# Patient Record
Sex: MALE | Race: BLACK OR AFRICAN AMERICAN | NOT HISPANIC OR LATINO | Employment: UNEMPLOYED | URBAN - METROPOLITAN AREA
[De-identification: names, ages, dates, MRNs, and addresses within clinical notes are randomized per-mention and may not be internally consistent; named-entity substitution may affect disease eponyms.]

---

## 2018-11-10 ENCOUNTER — HOSPITAL ENCOUNTER (INPATIENT)
Facility: HOSPITAL | Age: 23
LOS: 1 days | Discharge: HOME | DRG: 639 | End: 2018-11-11
Admitting: HOSPITALIST
Payer: MEDICARE

## 2018-11-10 DIAGNOSIS — R73.9 HYPERGLYCEMIA: ICD-10-CM

## 2018-11-10 DIAGNOSIS — E10.9 TYPE 1 DIABETES MELLITUS WITHOUT COMPLICATION: ICD-10-CM

## 2018-11-10 DIAGNOSIS — R52 PAIN: ICD-10-CM

## 2018-11-10 DIAGNOSIS — E10.10 DIABETIC KETOACIDOSIS WITHOUT COMA ASSOCIATED WITH TYPE 1 DIABETES MELLITUS: Primary | ICD-10-CM

## 2018-11-10 PROBLEM — E11.9 DIABETES: Chronic | Status: ACTIVE | Noted: 2018-11-10

## 2018-11-10 LAB
BACTERIA #/AREA URNS HPF: NORMAL /HPF
BILIRUB UR QL STRIP: NEGATIVE
CLARITY UR: CLEAR
COLOR UR: YELLOW
GLUCOSE UR QL STRIP: ABNORMAL
HGB UR QL STRIP: NEGATIVE
KETONES UR QL STRIP: ABNORMAL
LEUKOCYTE ESTERASE UR QL STRIP: NEGATIVE
MICROSCOPIC COMMENT: NORMAL
NITRITE UR QL STRIP: NEGATIVE
PH UR STRIP: 6 [PH] (ref 5–8)
PROT UR QL STRIP: NEGATIVE
RBC #/AREA URNS HPF: 2 /HPF (ref 0–4)
SP GR UR STRIP: <=1.005 (ref 1–1.03)
URN SPEC COLLECT METH UR: ABNORMAL
UROBILINOGEN UR STRIP-ACNC: NEGATIVE EU/DL
YEAST URNS QL MICRO: NORMAL

## 2018-11-10 PROCEDURE — 82962 GLUCOSE BLOOD TEST: CPT

## 2018-11-10 PROCEDURE — 25000003 PHARM REV CODE 250

## 2018-11-10 PROCEDURE — 87491 CHLMYD TRACH DNA AMP PROBE: CPT

## 2018-11-10 PROCEDURE — 99291 CRITICAL CARE FIRST HOUR: CPT | Mod: 25

## 2018-11-10 PROCEDURE — 81000 URINALYSIS NONAUTO W/SCOPE: CPT

## 2018-11-10 PROCEDURE — 87086 URINE CULTURE/COLONY COUNT: CPT

## 2018-11-10 PROCEDURE — 12000002 HC ACUTE/MED SURGE SEMI-PRIVATE ROOM

## 2018-11-10 RX ORDER — SODIUM CHLORIDE 9 MG/ML
1000 INJECTION, SOLUTION INTRAVENOUS
Status: COMPLETED | OUTPATIENT
Start: 2018-11-10 | End: 2018-11-11

## 2018-11-10 RX ORDER — IBUPROFEN 600 MG/1
600 TABLET ORAL
Status: COMPLETED | OUTPATIENT
Start: 2018-11-10 | End: 2018-11-10

## 2018-11-10 RX ORDER — INSULIN LISPRO 100 [IU]/ML
INJECTION, SOLUTION INTRAVENOUS; SUBCUTANEOUS
Status: ON HOLD | COMMUNITY
End: 2018-11-11 | Stop reason: CLARIF

## 2018-11-10 RX ADMIN — IBUPROFEN 600 MG: 600 TABLET, FILM COATED ORAL at 11:11

## 2018-11-11 VITALS
RESPIRATION RATE: 63 BRPM | BODY MASS INDEX: 18.4 KG/M2 | WEIGHT: 107.81 LBS | TEMPERATURE: 98 F | HEIGHT: 64 IN | SYSTOLIC BLOOD PRESSURE: 141 MMHG | HEART RATE: 106 BPM | OXYGEN SATURATION: 100 % | DIASTOLIC BLOOD PRESSURE: 82 MMHG

## 2018-11-11 PROBLEM — E10.9 TYPE 1 DIABETES MELLITUS WITHOUT COMPLICATION: Status: ACTIVE | Noted: 2018-11-11

## 2018-11-11 PROBLEM — R73.9 HYPERGLYCEMIA: Status: ACTIVE | Noted: 2018-11-11

## 2018-11-11 PROBLEM — E10.10 DIABETIC KETOACIDOSIS WITHOUT COMA ASSOCIATED WITH TYPE 1 DIABETES MELLITUS: Status: RESOLVED | Noted: 2018-11-11 | Resolved: 2018-11-11

## 2018-11-11 PROBLEM — E10.10 DIABETIC KETOACIDOSIS WITHOUT COMA ASSOCIATED WITH TYPE 1 DIABETES MELLITUS: Status: ACTIVE | Noted: 2018-11-11

## 2018-11-11 LAB
ALBUMIN SERPL BCP-MCNC: 3.4 G/DL
ALBUMIN SERPL BCP-MCNC: 4.1 G/DL
ALLENS TEST: ABNORMAL
ALP SERPL-CCNC: 142 U/L
ALP SERPL-CCNC: 174 U/L
ALT SERPL W/O P-5'-P-CCNC: 30 U/L
ALT SERPL W/O P-5'-P-CCNC: 39 U/L
ANION GAP SERPL CALC-SCNC: 13 MMOL/L
ANION GAP SERPL CALC-SCNC: 18 MMOL/L
ANION GAP SERPL CALC-SCNC: 9 MMOL/L
AST SERPL-CCNC: 25 U/L
AST SERPL-CCNC: 29 U/L
B-OH-BUTYR BLD STRIP-SCNC: 3.4 MMOL/L
BASOPHILS # BLD AUTO: 0.03 K/UL
BASOPHILS # BLD AUTO: ABNORMAL K/UL
BASOPHILS NFR BLD: 0 %
BASOPHILS NFR BLD: 0.2 %
BILIRUB SERPL-MCNC: 1 MG/DL
BILIRUB SERPL-MCNC: 1.3 MG/DL
BUN SERPL-MCNC: 13 MG/DL
BUN SERPL-MCNC: 17 MG/DL
BUN SERPL-MCNC: 22 MG/DL
CALCIUM SERPL-MCNC: 11 MG/DL
CALCIUM SERPL-MCNC: 9.1 MG/DL
CALCIUM SERPL-MCNC: 9.2 MG/DL
CHLORIDE SERPL-SCNC: 102 MMOL/L
CHLORIDE SERPL-SCNC: 103 MMOL/L
CHLORIDE SERPL-SCNC: 87 MMOL/L
CO2 SERPL-SCNC: 18 MMOL/L
CO2 SERPL-SCNC: 21 MMOL/L
CO2 SERPL-SCNC: 24 MMOL/L
CREAT SERPL-MCNC: 0.7 MG/DL
CREAT SERPL-MCNC: 0.7 MG/DL
CREAT SERPL-MCNC: 1.5 MG/DL
DIFFERENTIAL METHOD: ABNORMAL
DIFFERENTIAL METHOD: ABNORMAL
EOSINOPHIL # BLD AUTO: 0 K/UL
EOSINOPHIL # BLD AUTO: ABNORMAL K/UL
EOSINOPHIL NFR BLD: 0.2 %
EOSINOPHIL NFR BLD: 1 %
ERYTHROCYTE [DISTWIDTH] IN BLOOD BY AUTOMATED COUNT: 12.1 %
ERYTHROCYTE [DISTWIDTH] IN BLOOD BY AUTOMATED COUNT: 12.1 %
EST. GFR  (AFRICAN AMERICAN): >60 ML/MIN/1.73 M^2
EST. GFR  (NON AFRICAN AMERICAN): >60 ML/MIN/1.73 M^2
ESTIMATED AVG GLUCOSE: 344 MG/DL
GLUCOSE SERPL-MCNC: 185 MG/DL
GLUCOSE SERPL-MCNC: 237 MG/DL
GLUCOSE SERPL-MCNC: 790 MG/DL
HBA1C MFR BLD HPLC: 13.6 %
HCO3 UR-SCNC: 31.6 MMOL/L (ref 24–28)
HCT VFR BLD AUTO: 43.8 %
HCT VFR BLD AUTO: 52.2 %
HGB BLD-MCNC: 14.4 G/DL
HGB BLD-MCNC: 16.8 G/DL
LYMPHOCYTES # BLD AUTO: 2.8 K/UL
LYMPHOCYTES # BLD AUTO: ABNORMAL K/UL
LYMPHOCYTES NFR BLD: 20.8 %
LYMPHOCYTES NFR BLD: 47 %
MAGNESIUM SERPL-MCNC: 2.1 MG/DL
MCH RBC QN AUTO: 32.4 PG
MCH RBC QN AUTO: 32.9 PG
MCHC RBC AUTO-ENTMCNC: 32.2 G/DL
MCHC RBC AUTO-ENTMCNC: 32.9 G/DL
MCV RBC AUTO: 102 FL
MCV RBC AUTO: 99 FL
MONOCYTES # BLD AUTO: 1 K/UL
MONOCYTES # BLD AUTO: ABNORMAL K/UL
MONOCYTES NFR BLD: 6 %
MONOCYTES NFR BLD: 7.3 %
NEUTROPHILS # BLD AUTO: 9.6 K/UL
NEUTROPHILS NFR BLD: 46 %
NEUTROPHILS NFR BLD: 71.2 %
PCO2 BLDA: 60.7 MMHG (ref 35–45)
PH SMN: 7.33 [PH] (ref 7.35–7.45)
PHOSPHATE SERPL-MCNC: 4.1 MG/DL
PLATELET # BLD AUTO: 281 K/UL
PLATELET # BLD AUTO: 378 K/UL
PMV BLD AUTO: 8.8 FL
PMV BLD AUTO: 9.4 FL
PO2 BLDA: 22 MMHG (ref 40–60)
POC BE: 6 MMOL/L
POC SATURATED O2: 32 % (ref 95–100)
POC TCO2: 33 MMOL/L (ref 24–29)
POCT GLUCOSE: 158 MG/DL (ref 70–110)
POCT GLUCOSE: 171 MG/DL (ref 70–110)
POCT GLUCOSE: 177 MG/DL (ref 70–110)
POCT GLUCOSE: 237 MG/DL (ref 70–110)
POCT GLUCOSE: 247 MG/DL (ref 70–110)
POCT GLUCOSE: 363 MG/DL (ref 70–110)
POCT GLUCOSE: 374 MG/DL (ref 70–110)
POCT GLUCOSE: 407 MG/DL (ref 70–110)
POTASSIUM SERPL-SCNC: 4.2 MMOL/L
POTASSIUM SERPL-SCNC: 4.8 MMOL/L
POTASSIUM SERPL-SCNC: 6 MMOL/L
PROT SERPL-MCNC: 6.7 G/DL
PROT SERPL-MCNC: 7.9 G/DL
RBC # BLD AUTO: 4.44 M/UL
RBC # BLD AUTO: 5.11 M/UL
SAMPLE: ABNORMAL
SITE: ABNORMAL
SODIUM SERPL-SCNC: 129 MMOL/L
SODIUM SERPL-SCNC: 132 MMOL/L
SODIUM SERPL-SCNC: 134 MMOL/L
WBC # BLD AUTO: 11.9 K/UL
WBC # BLD AUTO: 13.48 K/UL

## 2018-11-11 PROCEDURE — S5571 INSULIN DISPOS PEN 3 ML: HCPCS | Performed by: FAMILY MEDICINE

## 2018-11-11 PROCEDURE — 80048 BASIC METABOLIC PNL TOTAL CA: CPT

## 2018-11-11 PROCEDURE — 80053 COMPREHEN METABOLIC PANEL: CPT | Mod: 91

## 2018-11-11 PROCEDURE — 36415 COLL VENOUS BLD VENIPUNCTURE: CPT

## 2018-11-11 PROCEDURE — 99223 1ST HOSP IP/OBS HIGH 75: CPT | Mod: AI,,, | Performed by: FAMILY MEDICINE

## 2018-11-11 PROCEDURE — 85007 BL SMEAR W/DIFF WBC COUNT: CPT

## 2018-11-11 PROCEDURE — 25000003 PHARM REV CODE 250

## 2018-11-11 PROCEDURE — 82010 KETONE BODYS QUAN: CPT

## 2018-11-11 PROCEDURE — 63600175 PHARM REV CODE 636 W HCPCS: Performed by: HOSPITALIST

## 2018-11-11 PROCEDURE — 83735 ASSAY OF MAGNESIUM: CPT

## 2018-11-11 PROCEDURE — 83036 HEMOGLOBIN GLYCOSYLATED A1C: CPT

## 2018-11-11 PROCEDURE — 96376 TX/PRO/DX INJ SAME DRUG ADON: CPT

## 2018-11-11 PROCEDURE — 20000000 HC ICU ROOM

## 2018-11-11 PROCEDURE — 84100 ASSAY OF PHOSPHORUS: CPT

## 2018-11-11 PROCEDURE — S5010 5% DEXTROSE AND 0.45% SALINE: HCPCS | Performed by: FAMILY MEDICINE

## 2018-11-11 PROCEDURE — 80053 COMPREHEN METABOLIC PANEL: CPT

## 2018-11-11 PROCEDURE — 63600175 PHARM REV CODE 636 W HCPCS: Performed by: FAMILY MEDICINE

## 2018-11-11 PROCEDURE — 94761 N-INVAS EAR/PLS OXIMETRY MLT: CPT

## 2018-11-11 PROCEDURE — 96361 HYDRATE IV INFUSION ADD-ON: CPT

## 2018-11-11 PROCEDURE — 82800 BLOOD PH: CPT

## 2018-11-11 PROCEDURE — 85027 COMPLETE CBC AUTOMATED: CPT

## 2018-11-11 PROCEDURE — 63600175 PHARM REV CODE 636 W HCPCS

## 2018-11-11 PROCEDURE — 85025 COMPLETE CBC W/AUTO DIFF WBC: CPT

## 2018-11-11 PROCEDURE — 96374 THER/PROPH/DIAG INJ IV PUSH: CPT

## 2018-11-11 PROCEDURE — 82803 BLOOD GASES ANY COMBINATION: CPT

## 2018-11-11 PROCEDURE — 25000003 PHARM REV CODE 250: Performed by: FAMILY MEDICINE

## 2018-11-11 RX ORDER — GLUCAGON 1 MG
1 KIT INJECTION
Status: DISCONTINUED | OUTPATIENT
Start: 2018-11-11 | End: 2018-11-11 | Stop reason: HOSPADM

## 2018-11-11 RX ORDER — IBUPROFEN 600 MG/1
600 TABLET ORAL
Status: DISCONTINUED | OUTPATIENT
Start: 2018-11-11 | End: 2018-11-11

## 2018-11-11 RX ORDER — AMOXICILLIN 500 MG/1
500 CAPSULE ORAL EVERY 12 HOURS
COMMUNITY
Start: 2018-11-05 | End: 2018-11-12

## 2018-11-11 RX ORDER — INSULIN ASPART 100 [IU]/ML
1-10 INJECTION, SOLUTION INTRAVENOUS; SUBCUTANEOUS
Status: DISCONTINUED | OUTPATIENT
Start: 2018-11-11 | End: 2018-11-11 | Stop reason: HOSPADM

## 2018-11-11 RX ORDER — DEXTROSE MONOHYDRATE 100 MG/ML
1000 INJECTION, SOLUTION INTRAVENOUS
Status: DISCONTINUED | OUTPATIENT
Start: 2018-11-11 | End: 2018-11-11

## 2018-11-11 RX ORDER — INSULIN ASPART 100 [IU]/ML
30 INJECTION, SUSPENSION SUBCUTANEOUS 2 TIMES DAILY
COMMUNITY

## 2018-11-11 RX ORDER — ENOXAPARIN SODIUM 100 MG/ML
40 INJECTION SUBCUTANEOUS EVERY 24 HOURS
Status: DISCONTINUED | OUTPATIENT
Start: 2018-11-11 | End: 2018-11-11 | Stop reason: HOSPADM

## 2018-11-11 RX ORDER — INSULIN ASPART 100 [IU]/ML
5 INJECTION, SOLUTION INTRAVENOUS; SUBCUTANEOUS
Status: DISCONTINUED | OUTPATIENT
Start: 2018-11-11 | End: 2018-11-11 | Stop reason: HOSPADM

## 2018-11-11 RX ORDER — IBUPROFEN 200 MG
16 TABLET ORAL
Status: DISCONTINUED | OUTPATIENT
Start: 2018-11-11 | End: 2018-11-11 | Stop reason: HOSPADM

## 2018-11-11 RX ORDER — SODIUM CHLORIDE AND POTASSIUM CHLORIDE 150; 450 MG/100ML; MG/100ML
INJECTION, SOLUTION INTRAVENOUS CONTINUOUS
Status: DISCONTINUED | OUTPATIENT
Start: 2018-11-11 | End: 2018-11-11 | Stop reason: HOSPADM

## 2018-11-11 RX ORDER — DEXTROSE MONOHYDRATE AND SODIUM CHLORIDE 5; .45 G/100ML; G/100ML
INJECTION, SOLUTION INTRAVENOUS CONTINUOUS
Status: DISCONTINUED | OUTPATIENT
Start: 2018-11-11 | End: 2018-11-11

## 2018-11-11 RX ORDER — IBUPROFEN 200 MG
24 TABLET ORAL
Status: DISCONTINUED | OUTPATIENT
Start: 2018-11-11 | End: 2018-11-11 | Stop reason: HOSPADM

## 2018-11-11 RX ORDER — IBUPROFEN 200 MG
1 TABLET ORAL DAILY
Status: DISCONTINUED | OUTPATIENT
Start: 2018-11-11 | End: 2018-11-11 | Stop reason: HOSPADM

## 2018-11-11 RX ORDER — SODIUM CHLORIDE 0.9 % (FLUSH) 0.9 %
5 SYRINGE (ML) INJECTION
Status: DISCONTINUED | OUTPATIENT
Start: 2018-11-11 | End: 2018-11-11 | Stop reason: HOSPADM

## 2018-11-11 RX ADMIN — POTASSIUM CHLORIDE AND SODIUM CHLORIDE: 450; 150 INJECTION, SOLUTION INTRAVENOUS at 09:11

## 2018-11-11 RX ADMIN — INSULIN HUMAN 5 UNITS: 100 INJECTION, SOLUTION PARENTERAL at 12:11

## 2018-11-11 RX ADMIN — INSULIN ASPART 4 UNITS: 100 INJECTION, SOLUTION INTRAVENOUS; SUBCUTANEOUS at 07:11

## 2018-11-11 RX ADMIN — DEXTROSE AND SODIUM CHLORIDE: 5; .45 INJECTION, SOLUTION INTRAVENOUS at 05:11

## 2018-11-11 RX ADMIN — SODIUM CHLORIDE 1000 ML: 0.9 INJECTION, SOLUTION INTRAVENOUS at 01:11

## 2018-11-11 RX ADMIN — INSULIN ASPART 2 UNITS: 100 INJECTION, SOLUTION INTRAVENOUS; SUBCUTANEOUS at 06:11

## 2018-11-11 RX ADMIN — SODIUM CHLORIDE 5 UNITS/HR: 9 INJECTION, SOLUTION INTRAVENOUS at 02:11

## 2018-11-11 RX ADMIN — SODIUM CHLORIDE 1000 ML: 0.9 INJECTION, SOLUTION INTRAVENOUS at 12:11

## 2018-11-11 RX ADMIN — INSULIN DETEMIR 10 UNITS: 100 INJECTION, SOLUTION SUBCUTANEOUS at 06:11

## 2018-11-11 RX ADMIN — SODIUM CHLORIDE 1000 ML: 0.9 INJECTION, SOLUTION INTRAVENOUS at 03:11

## 2018-11-11 RX ADMIN — INSULIN ASPART 5 UNITS: 100 INJECTION, SOLUTION INTRAVENOUS; SUBCUTANEOUS at 11:11

## 2018-11-11 NOTE — HPI
23 yr old pleasant black male with diabetes mellitus type 1, and no other significant medical history presents to [facility] emergency complaining of hyperglycemia and generalized abdominal pain. The pain was 4/10 in severity and aching to cramping type and no relieving factors. Last night, he checked his sugar and it was really high so he decided to come to emergency. No N/V/C/D/fever/ No cough or dysuria. On presentation, patient was severely hyperglycemic with ketosis and acidosis. He is admitted for IV insulin and fluids and managing DKA.

## 2018-11-11 NOTE — ED PROVIDER NOTES
"Encounter Date: 11/10/2018    SCRIBE #1 NOTE: I, Maria Luisa Abel, am scribing for, and in the presence of,  Dr. Mane. I have scribed the entire note.       History     Chief Complaint   Patient presents with    Abdominal Pain     reports "joint pain" to lower abd, has been present since pt was 19, but worse in the past 5 months. Pt reports tonight when trying to drink pain became worse.      Damon Lawson is a 23 y.o. male who has DM    The patient presents to the ED due to abdominal pain. He reports onset of symptoms was about 4 years ago. However, he notes the pain has become significantly worse over the last 5 months. The patient notes the pain is worse tonight. He has associated scrotal pain and burning with urination but denies any blood in urine, penile discharge, nausea, vomiting, fever or chills. The patient reports he has not been sexually active in 5 months. He was evaluated by his doctor for the symptoms about 1 week ago. The patient notes he was started on antibiotics but notes use of medication did not improve the pain.       The history is provided by the patient.     Review of patient's allergies indicates:  No Known Allergies  Past Medical History:   Diagnosis Date    Diabetes mellitus      History reviewed. No pertinent surgical history.  History reviewed. No pertinent family history.  Social History     Tobacco Use    Smoking status: Current Some Day Smoker    Smokeless tobacco: Current User   Substance Use Topics    Alcohol use: Not on file    Drug use: Not on file     Review of Systems   All other systems reviewed and are negative.      Physical Exam     Initial Vitals [11/10/18 2209]   BP Pulse Resp Temp SpO2   130/75 (!) 118 15 98.3 °F (36.8 °C) 100 %      MAP       --         Physical Exam    Nursing note and vitals reviewed.  Constitutional: He appears well-developed.   HENT:   Head: Normocephalic and atraumatic.   Eyes: EOM are normal.   Neck: Neck supple. "   Pulmonary/Chest: No respiratory distress.   Abdominal: He exhibits no distension.   Genitourinary: Penis normal. No discharge found.   Musculoskeletal:   No acute deformity   Neurological:   Intact to screening   Skin: Skin is dry.   Psychiatric: He has a normal mood and affect.         ED Course   Critical Care  Date/Time: 11/10/2018 11:37 PM  Performed by: True Mane MD  Authorized by: True Mane MD   Direct patient critical care time: 10 minutes  Additional history critical care time: 5 minutes  Ordering / reviewing critical care time: 5 minutes  Documentation critical care time: 5 minutes  Consulting other physicians critical care time: 5 minutes  Consult with family critical care time: 5 minutes  Total critical care time (exclusive of procedural time) : 35 minutes  Critical care time was exclusive of separately billable procedures and treating other patients and teaching time.  Critical care was necessary to treat or prevent imminent or life-threatening deterioration of the following conditions: metabolic crisis.  Critical care was time spent personally by me on the following activities: discussions with consultants, development of treatment plan with patient or surrogate, evaluation of patient's response to treatment, examination of patient, obtaining history from patient or surrogate, ordering and performing treatments and interventions, ordering and review of laboratory studies, re-evaluation of patient's condition, ordering and review of radiographic studies and pulse oximetry.        Labs Reviewed   URINALYSIS - Abnormal; Notable for the following components:       Result Value    Specific Gravity, UA <=1.005 (*)     Glucose, UA 3+ (*)     Ketones, UA 1+ (*)     All other components within normal limits   COMPREHENSIVE METABOLIC PANEL - Abnormal; Notable for the following components:    Sodium 129 (*)     Potassium 6.0 (*)     Chloride 87 (*)     Glucose 790 (*)     BUN, Bld 22 (*)      Creatinine 1.5 (*)     Calcium 11.0 (*)     Total Bilirubin 1.3 (*)     Alkaline Phosphatase 174 (*)     Anion Gap 18 (*)     All other components within normal limits    Narrative:     glu   critical result(s) called and verbal readback obtained from   Korin Gotti RN, 11/11/2018 01:31   CBC W/ AUTO DIFFERENTIAL - Abnormal; Notable for the following components:    WBC 13.48 (*)      (*)     MCH 32.9 (*)     Platelets 378 (*)     Gran # (ANC) 9.6 (*)     All other components within normal limits   BETA - HYDROXYBUTYRATE, SERUM - Abnormal; Notable for the following components:    Beta-Hydroxybutyrate 3.4 (*)     All other components within normal limits   ISTAT PROCEDURE - Abnormal; Notable for the following components:    POC PH 7.325 (*)     POC PCO2 60.7 (*)     POC PO2 22 (*)     POC HCO3 31.6 (*)     POC SATURATED O2 32 (*)     POC TCO2 33 (*)     All other components within normal limits   C. TRACHOMATIS/N. GONORRHOEAE BY AMP DNA   CULTURE, URINE   C. TRACHOMATIS/N. GONORRHOEAE BY AMP DNA   URINALYSIS MICROSCOPIC          Imaging Results          US Scrotum And Testicles (Final result)  Result time 11/10/18 23:04:31    Final result by Olivia Saucedo MD (11/10/18 23:04:31)                 Impression:      Small bilateral hydroceles.      Electronically signed by: Olivia Saucedo MD  Date:    11/10/2018  Time:    23:04             Narrative:    EXAMINATION:  US SCROTUM AND TESTICLES    CLINICAL HISTORY:  Pain, unspecified    TECHNIQUE:  Sonography of the scrotum and testes.    COMPARISON:  None.    FINDINGS:  Right testicle measures 3.2 x 1.8 x 2.4 cm.  Left testicle measures 3.7 x 1.7 x 2.7 cm.  Testicles are normal in homogeneous in echotexture with no solid testicular mass seen.  Epididymis are unremarkable bilaterally.  Normal vascularity is seen with no evidence of testicular torsion.  Small bilateral hydroceles are visualized.  No evidence of varicocele.                                 Medical  Decision Making:   Clinical Tests:   Lab Tests: Ordered and Reviewed  Radiological Study: Ordered and Reviewed  ED Management:  11:37 PM Acucheck found to be greater than 500. Patient reports history of diabetes and has been taking Humalog as directed    1:42 AM Case discussed with Dr. Penaloza of [facility], will accept the patient to the service of Dr. Hathaway                      Clinical Impression:     1. Hyperglycemia    2. Pain    3. Diabetic ketoacidosis without coma associated with type 1 diabetes mellitus         I, True Mane, personally performed the services described in this documentation. All medical record entries made by the scribe were at my direction and in my presence.  I have reviewed the chart and agree that the record reflects my personal performance and is accurate and complete. True Mane M.D. 1:18 AM 11/11/2018                   True Mane MD  11/11/18 0527

## 2018-11-11 NOTE — ED NOTES
IV fluid bolus continues. Pt with eyes closed resp even unlabored, lying on right side on stretcher. Awaiting further MD orders.

## 2018-11-11 NOTE — ED NOTES
Patient reports testicular pain since he was 19 years old. Pain increased starting 5 months ago.Pain rated 9 on 1-10 scale.Pain constant and sharp. Patient unable to sleep. Patient states he's been drinking lots of water and has burning on urination. Physician at bedside.

## 2018-11-11 NOTE — H&P
"[facility] Medicine  History & Physical    Patient Name: Damon Lawson  MRN: [id number]  Admission Date: 11/10/2018  Attending Physician: Ronald Hathaway MD   Primary Care Provider: Primary Doctor No         Patient information was obtained from patient and ER records.     Subjective:     Principal Problem:Diabetic ketoacidosis without coma associated with type 1 diabetes mellitus    Chief Complaint:   Chief Complaint   Patient presents with    Abdominal Pain     reports "joint pain" to lower abd, has been present since pt was 19, but worse in the past 5 months. Pt reports tonight when trying to drink pain became worse.         HPI: 23 yr old pleasant black male with diabetes mellitus type 1, and no other significant medical history presents to [facility] complaining of hyperglycemia and generalized abdominal pain. The pain was 4/10 in severity and aching to cramping type and no relieving factors. Last night, he checked his sugar and it was really high so he decided to come to emergency. No N/V/C/D/fever/ No cough or dysuria. On presentation, patient was severely hyperglycemic with ketosis and acidosis. He is admitted for IV insulin and fluids and managing DKA.    Past Medical History:   Diagnosis Date    Diabetes mellitus        History reviewed. No pertinent surgical history.    Review of patient's allergies indicates:  No Known Allergies    No current facility-administered medications on file prior to encounter.      Current Outpatient Medications on File Prior to Encounter   Medication Sig    insulin lispro (HUMALOG) 100 unit/mL injection Inject into the skin 3 (three) times daily before meals.     Family History     None        Tobacco Use    Smoking status: Current Some Day Smoker    Smokeless tobacco: Current User   Substance and Sexual Activity    Alcohol use: Not on file    Drug use: Not on file    Sexual activity: Not on file     Review of Systems "   Constitutional: Negative.  Negative for activity change, diaphoresis and unexpected weight change.   HENT: Negative.  Negative for congestion, ear pain, mouth sores, rhinorrhea and voice change.    Eyes: Negative.  Negative for pain, discharge and visual disturbance.   Respiratory: Negative.  Negative for apnea, cough and wheezing.    Cardiovascular: Negative.  Negative for chest pain and palpitations.   Gastrointestinal: Positive for abdominal pain. Negative for abdominal distention, anal bleeding, diarrhea and vomiting.   Endocrine: Negative.  Negative for cold intolerance and polyuria.   Genitourinary: Negative.  Negative for decreased urine volume, difficulty urinating, discharge, frequency and scrotal swelling.   Musculoskeletal: Negative.  Negative for back pain, myalgias and neck stiffness.   Skin: Negative.  Negative for color change and rash.   Allergic/Immunologic: Negative.  Negative for environmental allergies and immunocompromised state.   Neurological: Negative.  Negative for dizziness, speech difficulty, weakness and light-headedness.   Hematological: Negative.    Psychiatric/Behavioral: Negative.  Negative for agitation, dysphoric mood and suicidal ideas. The patient is not nervous/anxious.      Objective:     Vital Signs (Most Recent):  Temp: 98.5 °F (36.9 °C) (11/11/18 0330)  Pulse: 94 (11/11/18 0400)  Resp: (!) 23 (11/11/18 0400)  BP: 119/68 (11/11/18 0400)  SpO2: 99 % (11/11/18 0400) Vital Signs (24h Range):  Temp:  [98.3 °F (36.8 °C)-98.5 °F (36.9 °C)] 98.5 °F (36.9 °C)  Pulse:  [] 94  Resp:  [15-34] 23  SpO2:  [96 %-100 %] 99 %  BP: (119-133)/(68-88) 119/68     Weight: 48.9 kg (107 lb 12.9 oz)  Body mass index is 18.5 kg/m².    Physical Exam   Constitutional: He is oriented to person, place, and time. He appears well-developed and well-nourished.   HENT:   Head: Normocephalic and atraumatic.   Right Ear: External ear normal.   Left Ear: External ear normal.   Nose: Nose normal.    Mouth/Throat: Oropharynx is clear and moist. No oropharyngeal exudate.   Eyes: Conjunctivae and EOM are normal. Pupils are equal, round, and reactive to light. Right eye exhibits no discharge. Left eye exhibits no discharge. No scleral icterus.   Neck: Normal range of motion. Neck supple. No JVD present. No tracheal deviation present. No thyromegaly present.   Cardiovascular: Normal rate, regular rhythm, normal heart sounds and intact distal pulses. Exam reveals no gallop and no friction rub.   No murmur heard.  Pulmonary/Chest: Effort normal and breath sounds normal. No stridor. No respiratory distress. He has no wheezes. He has no rales. He exhibits no tenderness.   Abdominal: Soft. Bowel sounds are normal. He exhibits no distension and no mass. There is no tenderness. There is no rebound and no guarding. No hernia.   Musculoskeletal: Normal range of motion. He exhibits no edema or tenderness.   Lymphadenopathy:     He has no cervical adenopathy.   Neurological: He is alert and oriented to person, place, and time. He has normal reflexes. He displays normal reflexes. No cranial nerve deficit. He exhibits normal muscle tone. Coordination normal.   Skin: Skin is warm and dry. No rash noted. No erythema. No pallor.   Psychiatric: He has a normal mood and affect. His behavior is normal. Judgment and thought content normal.         CRANIAL NERVES     CN III, IV, VI   Pupils are equal, round, and reactive to light.  Extraocular motions are normal.       Significant Labs:   Admission on 11/10/2018   Component Date Value Ref Range Status    Specimen UA 11/10/2018 Urine, Clean Catch   Final    Color, UA 11/10/2018 Yellow  Yellow, Straw, Susan Final    Appearance, UA 11/10/2018 Clear  Clear Final    pH, UA 11/10/2018 6.0  5.0 - 8.0 Final    Specific Gravity, UA 11/10/2018 <=1.005* 1.005 - 1.030 Final    Protein, UA 11/10/2018 Negative  Negative Final    Comment: Recommend a 24 hour urine protein or a urine    protein/creatinine ratio if globulin induced proteinuria is  clinically suspected.      Glucose, UA 11/10/2018 3+* Negative Final    Ketones, UA 11/10/2018 1+* Negative Final    Bilirubin (UA) 11/10/2018 Negative  Negative Final    Occult Blood UA 11/10/2018 Negative  Negative Final    Nitrite, UA 11/10/2018 Negative  Negative Final    Urobilinogen, UA 11/10/2018 Negative  <2.0 EU/dL Final    Leukocytes, UA 11/10/2018 Negative  Negative Final    RBC, UA 11/10/2018 2  0 - 4 /hpf Final    Bacteria, UA 11/10/2018 None  None-Occ /hpf Final    Yeast, UA 11/10/2018 None  None Final    Microscopic Comment 11/10/2018 SEE COMMENT   Final    Comment: Other formed elements not mentioned in the report are not   present in the microscopic examination.       Sodium 11/11/2018 129* 136 - 145 mmol/L Final    Potassium 11/11/2018 6.0* 3.5 - 5.1 mmol/L Final    Chloride 11/11/2018 87* 95 - 110 mmol/L Final    CO2 11/11/2018 24  23 - 29 mmol/L Final    Glucose 11/11/2018 790* 70 - 110 mg/dL Final    Comment: glu   critical result(s) called and verbal readback obtained from   Korin Gotti RN, 11/11/2018 01:31      BUN, Bld 11/11/2018 22* 6 - 20 mg/dL Final    Creatinine 11/11/2018 1.5* 0.5 - 1.4 mg/dL Final    Calcium 11/11/2018 11.0* 8.7 - 10.5 mg/dL Final    Total Protein 11/11/2018 7.9  6.0 - 8.4 g/dL Final    Albumin 11/11/2018 4.1  3.5 - 5.2 g/dL Final    Total Bilirubin 11/11/2018 1.3* 0.1 - 1.0 mg/dL Final    Comment: For infants and newborns, interpretation of results should be based  on gestational age, weight and in agreement with clinical  observations.  Premature Infant recommended reference ranges:  Up to 24 hours.............<8.0 mg/dL  Up to 48 hours............<12.0 mg/dL  3-5 days..................<15.0 mg/dL  6-29 days.................<15.0 mg/dL      Alkaline Phosphatase 11/11/2018 174* 55 - 135 U/L Final    AST 11/11/2018 29  10 - 40 U/L Final    ALT 11/11/2018 39  10 - 44 U/L Final     Anion Gap 11/11/2018 18* 8 - 16 mmol/L Final    eGFR if African American 11/11/2018 >60  >60 mL/min/1.73 m^2 Final    eGFR if non African American 11/11/2018 >60  >60 mL/min/1.73 m^2 Final    Comment: Calculation used to obtain the estimated glomerular filtration  rate (eGFR) is the CKD-EPI equation.       WBC 11/11/2018 13.48* 3.90 - 12.70 K/uL Final    RBC 11/11/2018 5.11  4.60 - 6.20 M/uL Final    Hemoglobin 11/11/2018 16.8  14.0 - 18.0 g/dL Final    Hematocrit 11/11/2018 52.2  40.0 - 54.0 % Final    MCV 11/11/2018 102* 82 - 98 fL Final    MCH 11/11/2018 32.9* 27.0 - 31.0 pg Final    MCHC 11/11/2018 32.2  32.0 - 36.0 g/dL Final    RDW 11/11/2018 12.1  11.5 - 14.5 % Final    Platelets 11/11/2018 378* 150 - 350 K/uL Final    MPV 11/11/2018 9.4  9.2 - 12.9 fL Final    Gran # (ANC) 11/11/2018 9.6* 1.8 - 7.7 K/uL Final    Lymph # 11/11/2018 2.8  1.0 - 4.8 K/uL Final    Mono # 11/11/2018 1.0  0.3 - 1.0 K/uL Final    Eos # 11/11/2018 0.0  0.0 - 0.5 K/uL Final    Baso # 11/11/2018 0.03  0.00 - 0.20 K/uL Final    Gran% 11/11/2018 71.2  38.0 - 73.0 % Final    Lymph% 11/11/2018 20.8  18.0 - 48.0 % Final    Mono% 11/11/2018 7.3  4.0 - 15.0 % Final    Eosinophil% 11/11/2018 0.2  0.0 - 8.0 % Final    Basophil% 11/11/2018 0.2  0.0 - 1.9 % Final    Differential Method 11/11/2018 Automated   Final    Beta-Hydroxybutyrate 11/11/2018 3.4* 0.0 - 0.5 mmol/L Final    POC PH 11/11/2018 7.325* 7.35 - 7.45 Final    POC PCO2 11/11/2018 60.7* 35 - 45 mmHg Final    POC PO2 11/11/2018 22* 40 - 60 mmHg Final    POC HCO3 11/11/2018 31.6* 24 - 28 mmol/L Final    POC BE 11/11/2018 6  -2 to 2 mmol/L Final    POC SATURATED O2 11/11/2018 32* 95 - 100 % Final    POC TCO2 11/11/2018 33* 24 - 29 mmol/L Final    Sample 11/11/2018 VENOUS   Final    Site 11/11/2018 Other   Final    Allens Test 11/11/2018 N/A   Final    Sodium 11/11/2018 132* 136 - 145 mmol/L Final    Potassium 11/11/2018 4.2  3.5 - 5.1 mmol/L Final     Chloride 11/11/2018 102  95 - 110 mmol/L Final    CO2 11/11/2018 21* 23 - 29 mmol/L Final    Glucose 11/11/2018 237* 70 - 110 mg/dL Final    BUN, Bld 11/11/2018 17  6 - 20 mg/dL Final    Creatinine 11/11/2018 0.7  0.5 - 1.4 mg/dL Final    Calcium 11/11/2018 9.1  8.7 - 10.5 mg/dL Final    Anion Gap 11/11/2018 9  8 - 16 mmol/L Final    eGFR if African American 11/11/2018 >60  >60 mL/min/1.73 m^2 Final    eGFR if non African American 11/11/2018 >60  >60 mL/min/1.73 m^2 Final    Comment: Calculation used to obtain the estimated glomerular filtration  rate (eGFR) is the CKD-EPI equation.       POCT Glucose 11/11/2018 363* 70 - 110 mg/dL Final    POCT Glucose 11/11/2018 247* 70 - 110 mg/dL Final    POCT Glucose 11/11/2018 171* 70 - 110 mg/dL Final    POCT Glucose 11/11/2018 158* 70 - 110 mg/dL Final         Significant Imaging:   Imaging Results          US Scrotum And Testicles (Final result)  Result time 11/10/18 23:04:31    Final result by Olivia Saucedo MD (11/10/18 23:04:31)                 Impression:      Small bilateral hydroceles.      Electronically signed by: Olivia Saucedo MD  Date:    11/10/2018  Time:    23:04             Narrative:    EXAMINATION:  US SCROTUM AND TESTICLES    CLINICAL HISTORY:  Pain, unspecified    TECHNIQUE:  Sonography of the scrotum and testes.    COMPARISON:  None.    FINDINGS:  Right testicle measures 3.2 x 1.8 x 2.4 cm.  Left testicle measures 3.7 x 1.7 x 2.7 cm.  Testicles are normal in homogeneous in echotexture with no solid testicular mass seen.  Epididymis are unremarkable bilaterally.  Normal vascularity is seen with no evidence of testicular torsion.  Small bilateral hydroceles are visualized.  No evidence of varicocele.                                  Assessment/Plan:     Active Diagnoses:    Diagnosis Date Noted POA    PRINCIPAL PROBLEM:  Diabetic ketoacidosis without coma associated with type 1 diabetes mellitus [E10.10] 11/11/2018 Yes    Hyperglycemia  [R73.9] 11/11/2018 Yes    Type 1 diabetes mellitus without complication [E10.9] 11/11/2018 Yes    Diabetes [E11.9] 11/10/2018 Yes     Chronic      Problems Resolved During this Admission:     VTE Risk Mitigation (From admission, onward)        Ordered     IP VTE LOW RISK PATIENT  Once      11/11/18 0309     Place BC hose  Until discontinued      11/11/18 0309     Place BC hose  Until discontinued      11/11/18 0309        Admit to Hospital Medicine    1. Diabetic Ketoacidosis  -NPO initially  -IV Insulin and fluids  -once gap closed - transition to levemir and SSI    2. Prophylaxis  _SCD, PPI    3. abdominal pain  -resolved    Spent adequate time in obtaining history and explaining differentials    Critical care time spent on the evaluation and treatment of severe organ dysfunction, review of pertinent labs and imaging studies, discussions with consulting providers and discussions with patient/family: 95 minutes.    Christiano Penaloza MD  Department of Hospital Medicine   [facility]

## 2018-11-11 NOTE — CLINICAL REVIEW
Results for SHARRI PAINTER (MRN [id number]) as of 11/11/2018 01:54   Ref. Range 11/11/2018 00:32   POC PH Latest Ref Range: 7.35 - 7.45  7.325 (L)   POC PCO2 Latest Ref Range: 35 - 45 mmHg 60.7 (H)   POC PO2 Latest Ref Range: 40 - 60 mmHg 22 (LL)   POC BE Latest Ref Range: -2 to 2 mmol/L 6   POC HCO3 Latest Ref Range: 24 - 28 mmol/L 31.6 (H)   POC SATURATED O2 Latest Ref Range: 95 - 100 % 32 (L)   POC TCO2 Latest Ref Range: 24 - 29 mmol/L 33 (H)   Sample Unknown VENOUS   Allens Test Unknown N/A   Site Unknown Other   VBg results reported to DR. Mane at 0033.

## 2018-11-11 NOTE — EICU
eICU Note : New Admit :notified by the [facility]:    Brief HPI: 23-year-old male admitted , complaining of lower abdominal pain which is worse since the last 4 months sociability the burning during urination.    Vital Signs :temperature 98.3, pulse rate 94, respiratory rate 23, blood pressure 119/68,SPO2 99%      Camera Assessment :Pt lying in bed in no distress       Data:WBC 13.48, hemoglobin 16.8, hematocrit 52.2, platelets 378, sodium 129, potassium 6.0 , Chloride 87, CO2 24 , anion gap 18, BUN 22, creatinine 1.5, glucose 790, calcium 11.0, alkaline phosphatase 174, total protein 7.9, albumin 4.1, AST 29, ALT 39  VBG 7.32/60/22/31  Ultrasound of the testicles and scrotum:I testicle measures 3.2 x 1.8 x 2.4 cm left testicle measures 3.7 x 1.7 x 2.7 cm.  Testicles are normal in homogenesis in echo texture and no solid testicle a mass epididymis is unremarkable bilaterally.  No evidence of testicular torsion.    Impression and recommendations:  1. Acute DKA :On Insulin GTT .IV fluids .BMP q4   2.Bilateral small hydroceles .Supportive care  3.Peptic ulcer disease, DVT prophylaxis       Belinda Rusty VYAS  eICU Physician

## 2018-11-11 NOTE — PLAN OF CARE
11:20 am, [place] contacted by floor nurseCele, pt needs transportation home. TN called [phone], pt is ambulatory and does not require WC. [facility] will be here within the hour to transport pt home to [facility] [place], [place] [address], [place] informed floor nurseCele    Discharge orders noted, no HH or HME ordered.    Pt's nurse will go over medications/signs and symptoms prior to discharge       11/11/18 1012   Final Note   Assessment Type Final Discharge Note   Anticipated Discharge Disposition Home   What phone number can be called within the next 1-3 days to see how you are doing after discharge? [phone]   Hospital Follow Up  Appt(s) scheduled? No  (Offices closed for weekend. Patient to schedule own follow up appointment.)   Right Care Referral Info   Post Acute Recommendation No Care     Nataly Castillo, RN Transitional Navigator  [phone]

## 2018-11-11 NOTE — PROGRESS NOTES
Patient requires a lot of education. Printed and verbal education provided. Spoke with Dr. Penaloza to consult Diabetic educator. Per MD will consult them. Patient was transitioned from insulin drip to insulin sq. Will continue to monitor for any changes.

## 2018-11-12 LAB
BACTERIA UR CULT: NORMAL
C TRACH DNA SPEC QL NAA+PROBE: NOT DETECTED
N GONORRHOEA DNA SPEC QL NAA+PROBE: NOT DETECTED

## 2018-11-12 NOTE — HOSPITAL COURSE
Started on insulin gtt and gap closed. Transitioned to subcutaneous insulin and blood sugars were controlled. Confirmed with him that he had insulin at home and was taking it appropriately.

## 2019-05-06 ENCOUNTER — ED HISTORICAL/CONVERTED ENCOUNTER (OUTPATIENT)
Dept: OTHER | Age: 24
End: 2019-05-06

## 2019-12-03 NOTE — PLAN OF CARE
[facility]  NEW PATIENT VISIT NOTE 12/3/2019      CONSULT REQUESTED BY:  Dinora Barnes MD       Chief Complaint   Patient presents with   • Consultation     LBP / LLE        SUBJECTIVE  This is an initial visit for Taqueria MONTALVO Isatu who is being sent for consultation by Dinora Barnes MD for low back and left leg pain     Taqueria Lunsford is a 30 year old female. She states 1/18/2018 she was at work, bent over and lifted a 15-20lb object, twisted and felt a crack in her back and developed low back pain. She states that the pain is across her lumbosacral area with 80% on the left side and 20% on the right side. This is a constant with varying degrees of intensity pressure. She states a few months later she developed left leg paresthesia that is intermittent going down her left buttock, posterior thigh, calf into the plantar aspect of her foot. She states she also has intermittent neck pain that goes across her neck- equally bothersome on both sides and down to her shoulder blades. This is a tight pain- no radiation to her arms or chest/abdominal area. Her major complaint is 1- low back, 2- left leg, 3- neck.     Aggravating factors include lifting, twisting, laying on the floor (which then causes numbness to bilateral legs). alleviating factors include laying on the right side and laying flat.     Denies radicular pain, weakness, issues with bowel or bladder.    · Location/radiation/duration - low back/leg leg pain  · Current problem caused by - work accident   · Severity - Current is 6/10; best is 3/10; worst is 9/10.  · Time of day when pain is usually worse - end of day  · Sitting tolerance - several hours  · Standing tolerance - 30-60 minutes  · Walking tolerance - 30-60 minutes  · Associated signs or symptoms - numbness and tingling  · Significant recent change with bladder and/or bowel control - no  · History of similar symptoms in the past - no    Prior treatments/medications  Patient arrived to the icu at 0240 in [facility] Patient was transferred to the bed without any complications. MD was paged and requested for some orders. Patient was oriented to the room and encouraged to call the family to notify them of admission to the ICU. Per Pt does not want the family to know that he is admitted to the ICU.   tried/response:    Physical therapy:  Yes- 2 years ago- no help  TENs unit:  no  Chiropractor:  Yes- no help   Acupuncture:  no  Massage:  no  Injections:  no  Medications:  Tylenol- little help, gabapentin- helpful; tizanidine- helpful     SOCIAL HISTORY  Alcohol / Drug Hx:  no  Smoking Hx:  Reviewed as per Epic  Psychological Hx:  Yes- anxiety     WORK STATUS   Working:  Yes   Restrictions:  light duty  Type of Work:  Banker   Work Related Injury:  Yes  WC Case:  No- not supported   Litigation:  Yes- attempting to get work comp to cover    IMAGING & OTHER STUDIES   MRI Lumbar Spine 4/11/2018    Impression:    No significant spinal canal or neural foraminal stenosis. No  acute findings to account for patient's symptoms.    MRI Thoracic Spine 4/11/2018    1. Small left paracentral disc herniations noted at T8-9 and  T9-10 with slight deformity of the cord on the left at the  T8-9 level. There is no canal compromise or foraminal  stenosis at those levels. The canal and foramina are patent  at all other thoracic levels.  2. No intramedullary signal abnormality.    MEDICATIONS:  Reviewed and reconciled as per the Epic record on this date.    Of interest:    zanaflex  Tylenol   Gabapentin     Blood thinner?  no    ALLERGIES  ALLERGIES:   Allergen Reactions   • Ibuprofen RASH and Other (See Comments)     Patient only has 1 kidney.         Allergy to contrast dye?  no    PAST MEDICAL HISTORY  Past Medical History:   Diagnosis Date   • Acid reflux    • Anxiety    • Diabetes mellitus (CMS/HCC)     pre diabetes per pt - taking metformin   • Hypoplasia of one kidney    • PCOS (polycystic ovarian syndrome)    • PCOS (polycystic ovarian syndrome)    • PONV (postoperative nausea and vomiting)        SURGICAL HISTORY  Past Surgical History:   Procedure Laterality Date   • Appendectomy  2012   • Cholecystectomy  03/2017       FAMILY HISTORY  Family History   Problem Relation Age of Onset   • High blood pressure Father    • High  blood pressure Mother    • Anxiety disorder Mother    • Thyroid Mother    • Heart disease Paternal Grandfather    • Kidney disease Brother    • Thyroid Brother      Musculoskeletal:  no     REVIEW OF SYSTEMS    Please see HPI and note by MA above, which I have reviewed.  A 10 point ROS was performed, all systems negative except as noted with an \"X\" or within the HPI.    PHYSICAL EXAM   VITALS:    Visit Vitals  Ht 4' 8\" (1.422 m)   Wt 89.1 kg   LMP 12/19/2018 (Exact Date)   BMI 44.03 kg/m²     GENERAL:  Pleasant and appropriate, well-groomed, overly-nourished female in no acute distress.  Does not appear to be toxic from medications or otherwise.  Does not exhibit any pain behaviors.    NEUROLOGIC:   A&Ox3.   STABILITY:  Able to rise from a seated to standing position with use of the armrest for assistance.  No LOB.  GAIT:  Normal casual, non-spastic, non-antalgic gait.    STRENGTH:  Individual motor testing of the lower extremities bilaterally reveals hip flexors 5/5, quadriceps 5/5, hamstring 5/5, EHL 5/5, dorsiflexion 5/5, plantarflexion able to do 10 heel raises. 5/5 strength to BUE  SENSATION:  Intact to light touch in the upper and lower extremities bilaterally.   MOTOR:  Tone is normal in all four extremities without fasciculations, atrophy, or myoclonus.  There are no involuntary movements.       MSR:  Biceps:  2+; Brachioradialis:  2+;  Patellar:  2+; Achilles: 2+  Plantar responses downgoing bilaterally.   There is no ankle clonus noted.    COORDINATION:  Heel to shin- negative;  Coordination intact.   MUSCULOSKELETAL:   INSPECTION:  No masses, deformities or erythema noted to cervical, thoracic, and lumbar regions (spinous processes and musculature)   PALPATION:    Tenderness to:  Trapezius, rhomboids, L4-S1 spinous processes and paraspinous musculature, latissimus dorsi, SI joints   Spasmed Muscles:  Trapezius   ROM:    Lumbar:  Able to flex forward with fingertips 30\" off the ground. Flexion and  extension increases pain.   SLR:  Negative bilaterally- causes low back pain  Anterior thigh thrust:  Causes low back pain/SI joint pain  Internal and external hip rotation:  External hip rotation causes low back/SI joint pain  FABERE:  Negative bilaterally   VASCULAR:  No swelling or edema noted in the lower extremities, bilaterally.  Pedal pulses are palpable bilaterally.   SKIN:  No rashes, lesions, cafe-au-lait spots or ulcers noted on all four extremities or trunk.   RESPIRATORY:  No shortness of breath or accessory muscle usage noted.  LYMPHATIC:  No tenderness or enlargement of cervical and axillae lymph nodes.    ASSESSMENT/PLAN   IMPRESSION    1.  Chronic neck pain- myofascial and muscle spasms  2. Chronic low back pain- SI joint and myofascial pain  3. Intermittent left leg paresthesia     PLAN    1.  Spine PT with MFR  2. Dicussed chiropractic, acupuncture, massage, ACPP- declined  3. Could consider EMG LLE however MRI is negative and this is not her major complaint at this time, could be radiation from SI joint, will see how she is post PT completion   4. Will not take over work restrictions- from our standpoint she would not have any    MRI Lumbar reviewed- negative exam, no nerve impingement       Taqueria should follow-up in 10-12 weeks or sooner if symptoms worsen.  My card has been given.  All questions answered.  She should call us in the meantime with any further questions or concerns.    I would like to thank you Dinora Barnes MD for allowing me to participate in the care of Taqueria MONTALVO Isatu.  Please feel free to call me if you have any questions about their diagnosis or treatment plan.    Tanisha Thomas, IONCENCIO RN FNP-BC APNP /Supervising Physician:  Steph Enciso MD, Medical Director Back & Spine    CC:  Dinora Barnes MD

## 2022-02-09 ENCOUNTER — HOSPITAL ENCOUNTER (INPATIENT)
Facility: HOSPITAL | Age: 27
LOS: 1 days | Discharge: LEFT BEFORE DISCHARGE | DRG: 638 | End: 2022-02-10
Attending: EMERGENCY MEDICINE | Admitting: EMERGENCY MEDICINE
Payer: MEDICARE

## 2022-02-09 DIAGNOSIS — E11.10 DKA (DIABETIC KETOACIDOSIS): ICD-10-CM

## 2022-02-09 DIAGNOSIS — R73.9 HYPERGLYCEMIA: ICD-10-CM

## 2022-02-09 DIAGNOSIS — S99.921S FOOT INJURY, RIGHT, SEQUELA: ICD-10-CM

## 2022-02-09 PROBLEM — R79.89 PSEUDOHYPONATREMIA: Status: ACTIVE | Noted: 2022-02-09

## 2022-02-09 PROBLEM — E10.65 TYPE 1 DIABETES MELLITUS WITH HYPERGLYCEMIA: Status: ACTIVE | Noted: 2022-02-09

## 2022-02-09 PROBLEM — N17.9 ACUTE RENAL FAILURE: Status: ACTIVE | Noted: 2022-02-09

## 2022-02-09 PROBLEM — E87.29 HIGH ANION GAP METABOLIC ACIDOSIS: Status: ACTIVE | Noted: 2022-02-09

## 2022-02-09 PROBLEM — E87.5 HYPERKALEMIA, DIMINISHED RENAL EXCRETION: Status: ACTIVE | Noted: 2022-02-09

## 2022-02-09 LAB
ALBUMIN SERPL BCP-MCNC: 3.8 G/DL (ref 3.5–5.2)
ALLENS TEST: ABNORMAL
ALP SERPL-CCNC: 210 U/L (ref 55–135)
ALT SERPL W/O P-5'-P-CCNC: 30 U/L (ref 10–44)
AMPHET+METHAMPHET UR QL: NEGATIVE
ANION GAP SERPL CALC-SCNC: 10 MMOL/L (ref 8–16)
ANION GAP SERPL CALC-SCNC: 17 MMOL/L (ref 8–16)
ANION GAP SERPL CALC-SCNC: 19 MMOL/L (ref 8–16)
ANION GAP SERPL CALC-SCNC: 25 MMOL/L (ref 8–16)
APAP SERPL-MCNC: <3 UG/ML (ref 10–20)
AST SERPL-CCNC: 23 U/L (ref 10–40)
B-OH-BUTYR BLD STRIP-SCNC: 6.3 MMOL/L (ref 0–0.5)
BACTERIA #/AREA URNS HPF: NORMAL /HPF
BARBITURATES UR QL SCN>200 NG/ML: NEGATIVE
BASOPHILS # BLD AUTO: 0.05 K/UL (ref 0–0.2)
BASOPHILS NFR BLD: 0.6 % (ref 0–1.9)
BENZODIAZ UR QL SCN>200 NG/ML: NEGATIVE
BILIRUB SERPL-MCNC: 0.4 MG/DL (ref 0.1–1)
BILIRUB UR QL STRIP: NEGATIVE
BUN SERPL-MCNC: 20 MG/DL (ref 6–20)
BUN SERPL-MCNC: 27 MG/DL (ref 6–20)
BUN SERPL-MCNC: 31 MG/DL (ref 6–30)
BUN SERPL-MCNC: 34 MG/DL (ref 6–20)
BZE UR QL SCN: NEGATIVE
CALCIUM SERPL-MCNC: 7.9 MG/DL (ref 8.7–10.5)
CALCIUM SERPL-MCNC: 8 MG/DL (ref 8.7–10.5)
CALCIUM SERPL-MCNC: 9.2 MG/DL (ref 8.7–10.5)
CANNABINOIDS UR QL SCN: NEGATIVE
CHLORIDE SERPL-SCNC: 108 MMOL/L (ref 95–110)
CHLORIDE SERPL-SCNC: 108 MMOL/L (ref 95–110)
CHLORIDE SERPL-SCNC: 111 MMOL/L (ref 95–110)
CHLORIDE SERPL-SCNC: 95 MMOL/L (ref 95–110)
CLARITY UR: CLEAR
CO2 SERPL-SCNC: 16 MMOL/L (ref 23–29)
CO2 SERPL-SCNC: 8 MMOL/L (ref 23–29)
CO2 SERPL-SCNC: 9 MMOL/L (ref 23–29)
COLOR UR: COLORLESS
CREAT SERPL-MCNC: 0.6 MG/DL (ref 0.5–1.4)
CREAT SERPL-MCNC: 1 MG/DL (ref 0.5–1.4)
CREAT SERPL-MCNC: 1.4 MG/DL (ref 0.5–1.4)
CREAT SERPL-MCNC: 1.8 MG/DL (ref 0.5–1.4)
CREAT UR-MCNC: 17.5 MG/DL (ref 23–375)
CREAT UR-MCNC: 18.1 MG/DL (ref 23–375)
CTP QC/QA: YES
CTP QC/QA: YES
DELSYS: ABNORMAL
DIFFERENTIAL METHOD: ABNORMAL
EOSINOPHIL # BLD AUTO: 0 K/UL (ref 0–0.5)
EOSINOPHIL NFR BLD: 0.1 % (ref 0–8)
ERYTHROCYTE [DISTWIDTH] IN BLOOD BY AUTOMATED COUNT: 13.7 % (ref 11.5–14.5)
EST. GFR  (AFRICAN AMERICAN): 59 ML/MIN/1.73 M^2
EST. GFR  (AFRICAN AMERICAN): >60 ML/MIN/1.73 M^2
EST. GFR  (AFRICAN AMERICAN): >60 ML/MIN/1.73 M^2
EST. GFR  (NON AFRICAN AMERICAN): 51 ML/MIN/1.73 M^2
EST. GFR  (NON AFRICAN AMERICAN): >60 ML/MIN/1.73 M^2
EST. GFR  (NON AFRICAN AMERICAN): >60 ML/MIN/1.73 M^2
ETHANOL SERPL-MCNC: <10 MG/DL
GLUCOSE SERPL-MCNC: 340 MG/DL (ref 70–110)
GLUCOSE SERPL-MCNC: 398 MG/DL (ref 70–110)
GLUCOSE SERPL-MCNC: 570 MG/DL (ref 70–110)
GLUCOSE SERPL-MCNC: 757 MG/DL (ref 70–110)
GLUCOSE UR QL STRIP: ABNORMAL
HCO3 UR-SCNC: 10.6 MMOL/L (ref 24–28)
HCT VFR BLD AUTO: 53.6 % (ref 40–54)
HCT VFR BLD CALC: 46 %PCV (ref 36–54)
HGB BLD-MCNC: 16.6 G/DL (ref 14–18)
HGB UR QL STRIP: NEGATIVE
IMM GRANULOCYTES # BLD AUTO: 0.06 K/UL (ref 0–0.04)
IMM GRANULOCYTES NFR BLD AUTO: 0.7 % (ref 0–0.5)
KETONES UR QL STRIP: ABNORMAL
LACTATE SERPL-SCNC: 1.1 MMOL/L (ref 0.5–2.2)
LEUKOCYTE ESTERASE UR QL STRIP: NEGATIVE
LIPASE SERPL-CCNC: 126 U/L (ref 4–60)
LYMPHOCYTES # BLD AUTO: 1.9 K/UL (ref 1–4.8)
LYMPHOCYTES NFR BLD: 20.9 % (ref 18–48)
MAGNESIUM SERPL-MCNC: 2.3 MG/DL (ref 1.6–2.6)
MCH RBC QN AUTO: 30.3 PG (ref 27–31)
MCHC RBC AUTO-ENTMCNC: 31 G/DL (ref 32–36)
MCV RBC AUTO: 98 FL (ref 82–98)
METHADONE UR QL SCN>300 NG/ML: NEGATIVE
MICROSCOPIC COMMENT: NORMAL
MONOCYTES # BLD AUTO: 0.4 K/UL (ref 0.3–1)
MONOCYTES NFR BLD: 4.3 % (ref 4–15)
NEUTROPHILS # BLD AUTO: 6.6 K/UL (ref 1.8–7.7)
NEUTROPHILS NFR BLD: 73.4 % (ref 38–73)
NITRITE UR QL STRIP: NEGATIVE
NRBC BLD-RTO: 0 /100 WBC
OPIATES UR QL SCN: NEGATIVE
PCO2 BLDA: 34.8 MMHG (ref 35–45)
PCP UR QL SCN>25 NG/ML: NEGATIVE
PH SMN: 7.09 [PH] (ref 7.35–7.45)
PH UR STRIP: 5 [PH] (ref 5–8)
PHOSPHATE SERPL-MCNC: 6.1 MG/DL (ref 2.7–4.5)
PLATELET # BLD AUTO: 377 K/UL (ref 150–450)
PMV BLD AUTO: 10.1 FL (ref 9.2–12.9)
PO2 BLDA: 36 MMHG (ref 40–60)
POC BE: -18 MMOL/L
POC IONIZED CALCIUM: 1.2 MMOL/L (ref 1.06–1.42)
POC MOLECULAR INFLUENZA A AGN: NEGATIVE
POC MOLECULAR INFLUENZA B AGN: NEGATIVE
POC SATURATED O2: 50 % (ref 95–100)
POC TCO2 (MEASURED): 12 MMOL/L (ref 23–29)
POC TCO2: 12 MMOL/L (ref 24–29)
POCT GLUCOSE: 311 MG/DL (ref 70–110)
POCT GLUCOSE: 323 MG/DL (ref 70–110)
POCT GLUCOSE: 333 MG/DL (ref 70–110)
POCT GLUCOSE: 340 MG/DL (ref 70–110)
POCT GLUCOSE: 362 MG/DL (ref 70–110)
POCT GLUCOSE: 402 MG/DL (ref 70–110)
POCT GLUCOSE: 476 MG/DL (ref 70–110)
POCT GLUCOSE: >500 MG/DL (ref 70–110)
POTASSIUM BLD-SCNC: 4.9 MMOL/L (ref 3.5–5.1)
POTASSIUM SERPL-SCNC: 4.3 MMOL/L (ref 3.5–5.1)
POTASSIUM SERPL-SCNC: 5 MMOL/L (ref 3.5–5.1)
POTASSIUM SERPL-SCNC: 6 MMOL/L (ref 3.5–5.1)
PROT SERPL-MCNC: 7.9 G/DL (ref 6–8.4)
PROT UR QL STRIP: NEGATIVE
RBC # BLD AUTO: 5.48 M/UL (ref 4.6–6.2)
RBC #/AREA URNS HPF: 1 /HPF (ref 0–4)
SALICYLATES SERPL-MCNC: <5 MG/DL (ref 15–30)
SAMPLE: ABNORMAL
SAMPLE: ABNORMAL
SARS-COV-2 RDRP RESP QL NAA+PROBE: NEGATIVE
SITE: ABNORMAL
SODIUM BLD-SCNC: 135 MMOL/L (ref 136–145)
SODIUM SERPL-SCNC: 129 MMOL/L (ref 136–145)
SODIUM SERPL-SCNC: 133 MMOL/L (ref 136–145)
SODIUM SERPL-SCNC: 134 MMOL/L (ref 136–145)
SODIUM UR-SCNC: 57 MMOL/L (ref 20–250)
SP GR UR STRIP: 1.02 (ref 1–1.03)
TOXICOLOGY INFORMATION: ABNORMAL
URN SPEC COLLECT METH UR: ABNORMAL
UROBILINOGEN UR STRIP-ACNC: NEGATIVE EU/DL
WBC # BLD AUTO: 8.91 K/UL (ref 3.9–12.7)
YEAST URNS QL MICRO: NORMAL

## 2022-02-09 PROCEDURE — 82330 ASSAY OF CALCIUM: CPT

## 2022-02-09 PROCEDURE — 96374 THER/PROPH/DIAG INJ IV PUSH: CPT

## 2022-02-09 PROCEDURE — 80143 DRUG ASSAY ACETAMINOPHEN: CPT | Performed by: EMERGENCY MEDICINE

## 2022-02-09 PROCEDURE — 36415 COLL VENOUS BLD VENIPUNCTURE: CPT | Performed by: INTERNAL MEDICINE

## 2022-02-09 PROCEDURE — 99900035 HC TECH TIME PER 15 MIN (STAT)

## 2022-02-09 PROCEDURE — 87040 BLOOD CULTURE FOR BACTERIA: CPT | Performed by: EMERGENCY MEDICINE

## 2022-02-09 PROCEDURE — 82010 KETONE BODYS QUAN: CPT | Performed by: EMERGENCY MEDICINE

## 2022-02-09 PROCEDURE — 84300 ASSAY OF URINE SODIUM: CPT | Performed by: INTERNAL MEDICINE

## 2022-02-09 PROCEDURE — 84100 ASSAY OF PHOSPHORUS: CPT | Performed by: EMERGENCY MEDICINE

## 2022-02-09 PROCEDURE — 84295 ASSAY OF SERUM SODIUM: CPT

## 2022-02-09 PROCEDURE — 93005 ELECTROCARDIOGRAM TRACING: CPT

## 2022-02-09 PROCEDURE — 83690 ASSAY OF LIPASE: CPT | Performed by: EMERGENCY MEDICINE

## 2022-02-09 PROCEDURE — 96361 HYDRATE IV INFUSION ADD-ON: CPT

## 2022-02-09 PROCEDURE — 81000 URINALYSIS NONAUTO W/SCOPE: CPT | Mod: 59 | Performed by: EMERGENCY MEDICINE

## 2022-02-09 PROCEDURE — 80179 DRUG ASSAY SALICYLATE: CPT | Performed by: EMERGENCY MEDICINE

## 2022-02-09 PROCEDURE — 20000000 HC ICU ROOM

## 2022-02-09 PROCEDURE — 82570 ASSAY OF URINE CREATININE: CPT | Performed by: INTERNAL MEDICINE

## 2022-02-09 PROCEDURE — 63600175 PHARM REV CODE 636 W HCPCS: Performed by: INTERNAL MEDICINE

## 2022-02-09 PROCEDURE — 80053 COMPREHEN METABOLIC PANEL: CPT | Performed by: EMERGENCY MEDICINE

## 2022-02-09 PROCEDURE — 25000003 PHARM REV CODE 250: Performed by: INTERNAL MEDICINE

## 2022-02-09 PROCEDURE — 83605 ASSAY OF LACTIC ACID: CPT | Performed by: EMERGENCY MEDICINE

## 2022-02-09 PROCEDURE — 25000003 PHARM REV CODE 250: Performed by: EMERGENCY MEDICINE

## 2022-02-09 PROCEDURE — 99291 CRITICAL CARE FIRST HOUR: CPT | Mod: 25

## 2022-02-09 PROCEDURE — 83735 ASSAY OF MAGNESIUM: CPT | Performed by: EMERGENCY MEDICINE

## 2022-02-09 PROCEDURE — 85014 HEMATOCRIT: CPT

## 2022-02-09 PROCEDURE — 84132 ASSAY OF SERUM POTASSIUM: CPT

## 2022-02-09 PROCEDURE — 82565 ASSAY OF CREATININE: CPT

## 2022-02-09 PROCEDURE — 82962 GLUCOSE BLOOD TEST: CPT

## 2022-02-09 PROCEDURE — 87502 INFLUENZA DNA AMP PROBE: CPT

## 2022-02-09 PROCEDURE — 80048 BASIC METABOLIC PNL TOTAL CA: CPT | Performed by: INTERNAL MEDICINE

## 2022-02-09 PROCEDURE — 85025 COMPLETE CBC W/AUTO DIFF WBC: CPT | Performed by: EMERGENCY MEDICINE

## 2022-02-09 PROCEDURE — 82803 BLOOD GASES ANY COMBINATION: CPT

## 2022-02-09 PROCEDURE — 63600175 PHARM REV CODE 636 W HCPCS: Performed by: EMERGENCY MEDICINE

## 2022-02-09 PROCEDURE — 80307 DRUG TEST PRSMV CHEM ANLYZR: CPT | Performed by: EMERGENCY MEDICINE

## 2022-02-09 PROCEDURE — 82077 ASSAY SPEC XCP UR&BREATH IA: CPT | Performed by: EMERGENCY MEDICINE

## 2022-02-09 PROCEDURE — 93010 EKG 12-LEAD: ICD-10-PCS | Mod: ,,, | Performed by: INTERNAL MEDICINE

## 2022-02-09 PROCEDURE — 93010 ELECTROCARDIOGRAM REPORT: CPT | Mod: ,,, | Performed by: INTERNAL MEDICINE

## 2022-02-09 PROCEDURE — U0002 COVID-19 LAB TEST NON-CDC: HCPCS | Performed by: EMERGENCY MEDICINE

## 2022-02-09 RX ORDER — SODIUM CHLORIDE 0.9 % (FLUSH) 0.9 %
10 SYRINGE (ML) INJECTION
Status: DISCONTINUED | OUTPATIENT
Start: 2022-02-09 | End: 2022-02-10 | Stop reason: HOSPADM

## 2022-02-09 RX ORDER — DEXTROSE MONOHYDRATE 100 MG/ML
INJECTION, SOLUTION INTRAVENOUS
Status: DISCONTINUED | OUTPATIENT
Start: 2022-02-09 | End: 2022-02-09

## 2022-02-09 RX ORDER — INSULIN ASPART 100 [IU]/ML
10 INJECTION, SOLUTION INTRAVENOUS; SUBCUTANEOUS
COMMUNITY
Start: 2021-10-29

## 2022-02-09 RX ORDER — ACETAMINOPHEN 325 MG/1
650 TABLET ORAL EVERY 4 HOURS PRN
Status: DISCONTINUED | OUTPATIENT
Start: 2022-02-09 | End: 2022-02-10 | Stop reason: HOSPADM

## 2022-02-09 RX ORDER — SODIUM CHLORIDE 0.9 % (FLUSH) 0.9 %
10 SYRINGE (ML) INJECTION
Status: DISCONTINUED | OUTPATIENT
Start: 2022-02-09 | End: 2022-02-09

## 2022-02-09 RX ORDER — ENOXAPARIN SODIUM 100 MG/ML
30 INJECTION SUBCUTANEOUS EVERY 24 HOURS
Status: DISCONTINUED | OUTPATIENT
Start: 2022-02-09 | End: 2022-02-09

## 2022-02-09 RX ORDER — ENOXAPARIN SODIUM 100 MG/ML
40 INJECTION SUBCUTANEOUS EVERY 24 HOURS
Status: DISCONTINUED | OUTPATIENT
Start: 2022-02-09 | End: 2022-02-10 | Stop reason: HOSPADM

## 2022-02-09 RX ORDER — INSULIN GLARGINE 100 [IU]/ML
50 INJECTION, SOLUTION SUBCUTANEOUS DAILY
COMMUNITY
Start: 2021-10-29

## 2022-02-09 RX ORDER — ONDANSETRON 2 MG/ML
4 INJECTION INTRAMUSCULAR; INTRAVENOUS EVERY 6 HOURS PRN
Status: DISCONTINUED | OUTPATIENT
Start: 2022-02-09 | End: 2022-02-10 | Stop reason: HOSPADM

## 2022-02-09 RX ORDER — SODIUM CHLORIDE 9 MG/ML
125 INJECTION, SOLUTION INTRAVENOUS CONTINUOUS
Status: DISCONTINUED | OUTPATIENT
Start: 2022-02-09 | End: 2022-02-10

## 2022-02-09 RX ADMIN — SODIUM CHLORIDE 500 ML: 9 INJECTION, SOLUTION INTRAVENOUS at 01:02

## 2022-02-09 RX ADMIN — SODIUM CHLORIDE 125 ML/HR: 0.9 INJECTION, SOLUTION INTRAVENOUS at 02:02

## 2022-02-09 RX ADMIN — SODIUM ZIRCONIUM CYCLOSILICATE 10 G: 10 POWDER, FOR SUSPENSION ORAL at 04:02

## 2022-02-09 RX ADMIN — SODIUM CHLORIDE 1000 ML: 0.9 INJECTION, SOLUTION INTRAVENOUS at 11:02

## 2022-02-09 RX ADMIN — SODIUM CHLORIDE 4.9 UNITS/HR: 9 INJECTION, SOLUTION INTRAVENOUS at 01:02

## 2022-02-09 RX ADMIN — INSULIN HUMAN 6 UNITS: 100 INJECTION, SOLUTION PARENTERAL at 11:02

## 2022-02-09 RX ADMIN — SODIUM CHLORIDE 23.9 UNITS/HR: 9 INJECTION, SOLUTION INTRAVENOUS at 08:02

## 2022-02-09 RX ADMIN — ENOXAPARIN SODIUM 40 MG: 100 INJECTION SUBCUTANEOUS at 04:02

## 2022-02-09 NOTE — H&P
[facility] Emergency Dept  [facility] Medicine  History & Physical    Patient Name: Damon Lawson III  MRN: [id number]  Patient Class: IP- Inpatient  Admission Date: 2/9/2022  Attending Physician: Martina Garza MD   Primary Care Provider: Wilton Rivera MD         Patient information was obtained from patient, past medical records and ER records.     Subjective:     Principal Problem:Diabetic ketoacidosis without coma associated with type 1 diabetes mellitus    Chief Complaint:   Chief Complaint   Patient presents with    Weakness     EMS called to 26yo male that has been weak and fatigued x2 days. Stated that he ran out of insulin 2 days ago. Has been having polydipsia and polyuria and cbg was >500 at triage.         HPI: 27 year old male with diabetes mellitus type 1 who  presented with generalized weakness of two days in evolution. Associated symptoms include excessive urination and thirst. Also with diarrhea. Patient recently relocated to [place] from [place] a few weeks ago. Apparently living in his car and ran out of insulin 3 days ago. States he cannot afford $5 deductible for a refill. Has not found a job yet. Takes 50 U of Lantus daily. In the ED, patient was tachycardic to 110s, low blood pressure and labwork consistent with diabetic ketoacidosis. Afebrile and stable O2 sats at room air. Given isotonic fluids and started on insulin infusion. Admitted to hospital medicine for ICU care.           Past Medical History:   Diagnosis Date    Diabetes mellitus        No past surgical history on file.    Review of patient's allergies indicates:  No Known Allergies    No current facility-administered medications on file prior to encounter.     Current Outpatient Medications on File Prior to Encounter   Medication Sig    insulin asp prt-insulin aspart, NOVOLOG 70/30, (NOVOLOG MIX 70-30 U-100 INSULN) 100 unit/mL (70-30) Soln Inject 30 Units into the skin 2 (two) times daily.     Family History    None        Tobacco Use    Smoking status: Current Some Day Smoker    Smokeless tobacco: Current User   Substance and Sexual Activity    Alcohol use: Not on file    Drug use: Not on file    Sexual activity: Not on file     Review of Systems   Constitutional: Positive for activity change.   HENT: Negative.    Eyes: Negative.    Respiratory: Negative.    Cardiovascular: Negative.    Gastrointestinal: Negative.    Endocrine: Positive for polydipsia and polyuria.   Musculoskeletal: Negative.    Skin: Negative.    Neurological: Positive for weakness.   Hematological: Negative.    Psychiatric/Behavioral: Negative.      Objective:     Vital Signs (Most Recent):  Temp: 98.1 °F (36.7 °C) (02/09/22 1030)  Pulse: 100 (02/09/22 1350)  Resp: 16 (02/09/22 1350)  BP: (!) 96/56 (02/09/22 1350)  SpO2: 100 % (02/09/22 1350) Vital Signs (24h Range):  Temp:  [98.1 °F (36.7 °C)] 98.1 °F (36.7 °C)  Pulse:  [100-109] 100  Resp:  [16-18] 16  SpO2:  [99 %-100 %] 100 %  BP: ()/(56-75) 96/56     Weight: 49.9 kg (110 lb)  Body mass index is 17.75 kg/m².    Physical Exam  Vitals and nursing note reviewed.   Constitutional:       General: He is not in acute distress.     Appearance: He is not ill-appearing or toxic-appearing.      Comments: Eating dessert    HENT:      Head: Normocephalic.      Mouth/Throat:      Mouth: Mucous membranes are moist.   Eyes:      Extraocular Movements: Extraocular movements intact.   Cardiovascular:      Rate and Rhythm: Regular rhythm. Tachycardia present.   Pulmonary:      Effort: Pulmonary effort is normal.      Breath sounds: No wheezing or rales.   Abdominal:      General: There is no distension.      Palpations: Abdomen is soft.      Tenderness: There is no abdominal tenderness.   Musculoskeletal:      Cervical back: Normal range of motion.      Right lower leg: No edema.      Left lower leg: No edema.   Skin:     General: Skin is warm and dry.   Neurological:      Mental Status: He is alert and oriented  to person, place, and time.   Psychiatric:         Mood and Affect: Mood normal.         Behavior: Behavior normal.         Thought Content: Thought content normal.         Judgment: Judgment normal.             Significant Labs: All pertinent labs within the past 24 hours have been reviewed.    Significant Imaging: I have reviewed all pertinent imaging results/findings within the past 24 hours.  I have reviewed and interpreted all pertinent imaging results/findings within the past 24 hours.    Assessment/Plan:     * Diabetic ketoacidosis without coma associated with type 1 diabetes mellitus  Met criteria with hyperglycemia, high anion gap metabolic acidosis and know DM1.  Intravenous fluids, insulin infusion, accuchecks every hour and BMP every 4 hours.  Currently with hyperkalemia, which should improve with fluids. Cardiac monitoring.   If still acidotic despite correction of AG, and glucose < 200, will change fluids to half normal with dextrose  Admit to ICU    Hyperkalemia, diminished renal excretion  Likely due to acute renal failure  I personally reviewed EKG. No changes associated with hyperkalemia  Will give a dose of sodium zirconium  Cardiac monitoring.       High anion gap metabolic acidosis  2/2 DKA. BMP every 4 hours      Pseudohyponatremia  Corrected sodium is 140-145    Acute renal failure  Given diagnosis of DKA, I suspect this is pre-renal in nature  Urine lytes pending. Will consider US if not better with fluids        Type 1 diabetes mellitus with hyperglycemia  Based on prior HgbA1c and multiple admission for DKA, this is uncontrolled  Repeat HgbA1c pending  Will consult [facility] for assistance with insulin refill        VTE Risk Mitigation (From admission, onward)         Ordered     enoxaparin injection 40 mg  Daily         02/09/22 8056               Time spent: > 35 minutes in patient's care    Martina Pinzon MD  Department of Hospital Medicine   [facility] [place] - Emergency Dept
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
## 2022-02-09 NOTE — PHARMACY MED REC
"Admission Medication History     The home medication history was taken by Kylah Rosario CPhT.    You may go to "Admission" then "Reconcile Home Medications" tabs to review and/or act upon these items.      The home medication list has been updated by the Pharmacy department.    Please read ALL comments highlighted in yellow.    Please address this information as you see fit.     Feel free to contact us if you have any questions or require assistance.      The medications listed below were removed from the home medication list. Please reorder if appropriate:  Patient reports no longer taking the following medication(s):             Medications listed below were obtained from: Patient/family and Patient's pharmacy      Potential issues to be addressed PRIOR TO DISCHARGE  Please discuss with the patient barriers to adherence with medication treatment plans  Prescriptions could not be filled prior to admission: (lantus and novolog insulin)     Patient states that he had not taken his insulin in 2 days due to being homeless for 4 days patient is having problems with receiving insulin.      Kylah Rosario CPhT.  [phone]                .        "

## 2022-02-09 NOTE — ASSESSMENT & PLAN NOTE
Likely due to acute renal failure  I personally reviewed EKG. No changes associated with hyperkalemia  Will give a dose of sodium zirconium  Cardiac monitoring.

## 2022-02-09 NOTE — ED NOTES
"Pt biba after "not feeling well" and unable to take his insulin for past 2 days because he ran out. BG >500. +polydipsia, +polyuria. Pt awake, alert, oriented. Placed on full cardiopulmonary monitoring. IV 20g est in bilateral ac's, blood specimens obtained. Blood cultures x2 obtained. IVF transfusing and pt medicated per order, tolerated well. Urine sent to lab. [facility] and provide care,  "

## 2022-02-09 NOTE — ASSESSMENT & PLAN NOTE
Met criteria with hyperglycemia, high anion gap metabolic acidosis and know DM1.  Intravenous fluids, insulin infusion, accuchecks every hour and BMP every 4 hours.  Currently with hyperkalemia, which should improve with fluids. Cardiac monitoring.   If still acidotic despite correction of AG, and glucose < 200, will change fluids to half normal with dextrose  Admit to ICU

## 2022-02-09 NOTE — ASSESSMENT & PLAN NOTE
Based on prior HgbA1c and multiple admission for DKA, this is uncontrolled  Repeat HgbA1c pending  Will consult SW for assistance with insulin refill

## 2022-02-09 NOTE — HPI
27 year old male with diabetes mellitus type 1 who  presented with generalized weakness of two days in evolution. Associated symptoms include excessive urination and thirst. Also with diarrhea. Patient recently relocated to [place] from [place] a few weeks ago. Apparently living in his car and ran out of insulin 3 days ago. States he cannot afford $5 deductible for a refill. Has not found a job yet. Takes 50 U of Lantus daily. In the ED, patient was tachycardic to 110s, low blood pressure and labwork consistent with diabetic ketoacidosis. Afebrile and stable O2 sats at room air. Given isotonic fluids and started on insulin infusion. Admitted to hospital medicine for ICU care.

## 2022-02-09 NOTE — SUBJECTIVE & OBJECTIVE
Past Medical History:   Diagnosis Date    Diabetes mellitus        No past surgical history on file.    Review of patient's allergies indicates:  No Known Allergies    No current facility-administered medications on file prior to encounter.     Current Outpatient Medications on File Prior to Encounter   Medication Sig    insulin asp prt-insulin aspart, NOVOLOG 70/30, (NOVOLOG MIX 70-30 U-100 INSULN) 100 unit/mL (70-30) Soln Inject 30 Units into the skin 2 (two) times daily.     Family History    None       Tobacco Use    Smoking status: Current Some Day Smoker    Smokeless tobacco: Current User   Substance and Sexual Activity    Alcohol use: Not on file    Drug use: Not on file    Sexual activity: Not on file     Review of Systems   Constitutional: Positive for activity change.   HENT: Negative.    Eyes: Negative.    Respiratory: Negative.    Cardiovascular: Negative.    Gastrointestinal: Negative.    Endocrine: Positive for polydipsia and polyuria.   Musculoskeletal: Negative.    Skin: Negative.    Neurological: Positive for weakness.   Hematological: Negative.    Psychiatric/Behavioral: Negative.      Objective:     Vital Signs (Most Recent):  Temp: 98.1 °F (36.7 °C) (02/09/22 1030)  Pulse: 100 (02/09/22 1350)  Resp: 16 (02/09/22 1350)  BP: (!) 96/56 (02/09/22 1350)  SpO2: 100 % (02/09/22 1350) Vital Signs (24h Range):  Temp:  [98.1 °F (36.7 °C)] 98.1 °F (36.7 °C)  Pulse:  [100-109] 100  Resp:  [16-18] 16  SpO2:  [99 %-100 %] 100 %  BP: ()/(56-75) 96/56     Weight: 49.9 kg (110 lb)  Body mass index is 17.75 kg/m².    Physical Exam  Vitals and nursing note reviewed.   Constitutional:       General: He is not in acute distress.     Appearance: He is not ill-appearing or toxic-appearing.      Comments: Eating dessert    HENT:      Head: Normocephalic.      Mouth/Throat:      Mouth: Mucous membranes are moist.   Eyes:      Extraocular Movements: Extraocular movements intact.   Cardiovascular:      Rate  and Rhythm: Regular rhythm. Tachycardia present.   Pulmonary:      Effort: Pulmonary effort is normal.      Breath sounds: No wheezing or rales.   Abdominal:      General: There is no distension.      Palpations: Abdomen is soft.      Tenderness: There is no abdominal tenderness.   Musculoskeletal:      Cervical back: Normal range of motion.      Right lower leg: No edema.      Left lower leg: No edema.   Skin:     General: Skin is warm and dry.   Neurological:      Mental Status: He is alert and oriented to person, place, and time.   Psychiatric:         Mood and Affect: Mood normal.         Behavior: Behavior normal.         Thought Content: Thought content normal.         Judgment: Judgment normal.             Significant Labs: All pertinent labs within the past 24 hours have been reviewed.    Significant Imaging: I have reviewed all pertinent imaging results/findings within the past 24 hours.  I have reviewed and interpreted all pertinent imaging results/findings within the past 24 hours.

## 2022-02-10 VITALS
HEIGHT: 66 IN | RESPIRATION RATE: 36 BRPM | BODY MASS INDEX: 16.83 KG/M2 | HEART RATE: 104 BPM | WEIGHT: 104.75 LBS | SYSTOLIC BLOOD PRESSURE: 115 MMHG | TEMPERATURE: 98 F | DIASTOLIC BLOOD PRESSURE: 80 MMHG | OXYGEN SATURATION: 100 %

## 2022-02-10 LAB
ANION GAP SERPL CALC-SCNC: 4 MMOL/L (ref 8–16)
B-OH-BUTYR BLD STRIP-SCNC: 0 MMOL/L (ref 0–0.5)
BUN SERPL-MCNC: 16 MG/DL (ref 6–20)
CALCIUM SERPL-MCNC: 7.8 MG/DL (ref 8.7–10.5)
CHLORIDE SERPL-SCNC: 111 MMOL/L (ref 95–110)
CO2 SERPL-SCNC: 22 MMOL/L (ref 23–29)
CREAT SERPL-MCNC: 0.9 MG/DL (ref 0.5–1.4)
EST. GFR  (AFRICAN AMERICAN): >60 ML/MIN/1.73 M^2
EST. GFR  (NON AFRICAN AMERICAN): >60 ML/MIN/1.73 M^2
ESTIMATED AVG GLUCOSE: ABNORMAL MG/DL (ref 68–131)
GLUCOSE SERPL-MCNC: 164 MG/DL (ref 70–110)
HBA1C MFR BLD: >14 % (ref 4–5.6)
PHOSPHATE SERPL-MCNC: 1.9 MG/DL (ref 2.7–4.5)
POCT GLUCOSE: 144 MG/DL (ref 70–110)
POCT GLUCOSE: 153 MG/DL (ref 70–110)
POCT GLUCOSE: 168 MG/DL (ref 70–110)
POCT GLUCOSE: 169 MG/DL (ref 70–110)
POCT GLUCOSE: 172 MG/DL (ref 70–110)
POCT GLUCOSE: 175 MG/DL (ref 70–110)
POCT GLUCOSE: 185 MG/DL (ref 70–110)
POCT GLUCOSE: 278 MG/DL (ref 70–110)
POTASSIUM SERPL-SCNC: 4 MMOL/L (ref 3.5–5.1)
SODIUM SERPL-SCNC: 137 MMOL/L (ref 136–145)

## 2022-02-10 PROCEDURE — 25000003 PHARM REV CODE 250: Performed by: INTERNAL MEDICINE

## 2022-02-10 PROCEDURE — 83036 HEMOGLOBIN GLYCOSYLATED A1C: CPT | Performed by: INTERNAL MEDICINE

## 2022-02-10 PROCEDURE — 80048 BASIC METABOLIC PNL TOTAL CA: CPT | Performed by: INTERNAL MEDICINE

## 2022-02-10 PROCEDURE — C9399 UNCLASSIFIED DRUGS OR BIOLOG: HCPCS | Performed by: INTERNAL MEDICINE

## 2022-02-10 PROCEDURE — 84100 ASSAY OF PHOSPHORUS: CPT | Performed by: INTERNAL MEDICINE

## 2022-02-10 PROCEDURE — 82010 KETONE BODYS QUAN: CPT | Performed by: INTERNAL MEDICINE

## 2022-02-10 PROCEDURE — S5010 5% DEXTROSE AND 0.45% SALINE: HCPCS | Performed by: INTERNAL MEDICINE

## 2022-02-10 PROCEDURE — 36415 COLL VENOUS BLD VENIPUNCTURE: CPT | Performed by: INTERNAL MEDICINE

## 2022-02-10 PROCEDURE — 63600175 PHARM REV CODE 636 W HCPCS: Performed by: INTERNAL MEDICINE

## 2022-02-10 PROCEDURE — 94761 N-INVAS EAR/PLS OXIMETRY MLT: CPT

## 2022-02-10 RX ORDER — IBUPROFEN 200 MG
24 TABLET ORAL
Status: DISCONTINUED | OUTPATIENT
Start: 2022-02-10 | End: 2022-02-10 | Stop reason: HOSPADM

## 2022-02-10 RX ORDER — INSULIN ASPART 100 [IU]/ML
4 INJECTION, SOLUTION INTRAVENOUS; SUBCUTANEOUS
Status: DISCONTINUED | OUTPATIENT
Start: 2022-02-10 | End: 2022-02-10

## 2022-02-10 RX ORDER — MICONAZOLE NITRATE 2 %
POWDER (GRAM) TOPICAL 2 TIMES DAILY
Status: DISCONTINUED | OUTPATIENT
Start: 2022-02-10 | End: 2022-02-10 | Stop reason: HOSPADM

## 2022-02-10 RX ORDER — IBUPROFEN 200 MG
16 TABLET ORAL
Status: DISCONTINUED | OUTPATIENT
Start: 2022-02-10 | End: 2022-02-10 | Stop reason: HOSPADM

## 2022-02-10 RX ORDER — INSULIN ASPART 100 [IU]/ML
1-10 INJECTION, SOLUTION INTRAVENOUS; SUBCUTANEOUS
Status: DISCONTINUED | OUTPATIENT
Start: 2022-02-10 | End: 2022-02-10 | Stop reason: HOSPADM

## 2022-02-10 RX ORDER — GLUCAGON 1 MG
1 KIT INJECTION
Status: DISCONTINUED | OUTPATIENT
Start: 2022-02-10 | End: 2022-02-10 | Stop reason: HOSPADM

## 2022-02-10 RX ORDER — MUPIROCIN 20 MG/G
OINTMENT TOPICAL 2 TIMES DAILY
Status: DISCONTINUED | OUTPATIENT
Start: 2022-02-10 | End: 2022-02-10 | Stop reason: HOSPADM

## 2022-02-10 RX ORDER — DEXTROSE MONOHYDRATE AND SODIUM CHLORIDE 5; .45 G/100ML; G/100ML
INJECTION, SOLUTION INTRAVENOUS CONTINUOUS
Status: DISCONTINUED | OUTPATIENT
Start: 2022-02-10 | End: 2022-02-10

## 2022-02-10 RX ORDER — INSULIN ASPART 100 [IU]/ML
8 INJECTION, SOLUTION INTRAVENOUS; SUBCUTANEOUS
Status: DISCONTINUED | OUTPATIENT
Start: 2022-02-10 | End: 2022-02-10 | Stop reason: HOSPADM

## 2022-02-10 RX ORDER — DIPHENHYDRAMINE HCL 25 MG
25 CAPSULE ORAL EVERY 6 HOURS PRN
Status: DISCONTINUED | OUTPATIENT
Start: 2022-02-10 | End: 2022-02-10

## 2022-02-10 RX ADMIN — ACETAMINOPHEN 650 MG: 325 TABLET ORAL at 01:02

## 2022-02-10 RX ADMIN — INSULIN DETEMIR 15 UNITS: 100 INJECTION, SOLUTION SUBCUTANEOUS at 04:02

## 2022-02-10 RX ADMIN — DIPHENHYDRAMINE HYDROCHLORIDE 25 MG: 25 CAPSULE ORAL at 01:02

## 2022-02-10 RX ADMIN — INSULIN ASPART 4 UNITS: 100 INJECTION, SOLUTION INTRAVENOUS; SUBCUTANEOUS at 07:02

## 2022-02-10 RX ADMIN — MICONAZOLE NITRATE: 20 POWDER TOPICAL at 09:02

## 2022-02-10 RX ADMIN — INSULIN ASPART 2 UNITS: 100 INJECTION, SOLUTION INTRAVENOUS; SUBCUTANEOUS at 07:02

## 2022-02-10 RX ADMIN — MICONAZOLE NITRATE: 20 POWDER TOPICAL at 01:02

## 2022-02-10 RX ADMIN — MUPIROCIN: 20 OINTMENT TOPICAL at 09:02

## 2022-02-10 RX ADMIN — DEXTROSE AND SODIUM CHLORIDE: 5; .45 INJECTION, SOLUTION INTRAVENOUS at 01:02

## 2022-02-10 NOTE — SIGNIFICANT EVENT
"Mr. Lawson became despondent and asked to leave AMA.  He expressed concerns about wanting a diet, being homeless, and wanting to just, "get some help."  He agreed to stay with the caveat that he can have a diet and that social work see him in the am.    "

## 2022-02-10 NOTE — PLAN OF CARE
[facility] - Intensive Care  Initial Discharge Assessment       Primary Care Provider: Wilton Rivera MD    Admission Diagnosis: DKA (diabetic ketoacidosis) [E11.10]  Hyperglycemia [R73.9]  Foot injury, right, sequela [S99.921S]    Admission Date: 2/9/2022  Expected Discharge Date:      Discharge Barriers Identified: Homeless    Payor: VANTAGE MANAGED MEDICARE / Plan: VANTAGE MEDICARE ADVANTAGE / Product Type: Medicare Advantage /     Extended Emergency Contact Information  Primary Emergency Contact: YOSSI BULLOCK  Mobile Phone: [phone]  Relation: Mother    Discharge Plan A: Shelter [facility]  [address]  [address]   [place] [address]  Phone: [phone] Fax: [phone]      Initial Assessment (most recent)       Adult Discharge Assessment - 02/09/22 1921          Discharge Assessment    Assessment Type Discharge Planning Assessment     Confirmed/corrected address, phone number and insurance No     Reason Other (see comment)   Patient states that he is homeless    Source of Information patient;health record     When was your last doctors appointment? 02/04/22     Does patient/caregiver understand observation status --   n/a    Communicated ROBERT with patient/caregiver No     Reason For Admission DKA     Lives With other (see comments)   Homeless    Facility Arrived From: Area near [facility]     Do you expect to return to your current living situation? Other (see comments)   Shelter    Do you have help at home or someone to help you manage your care at home? No     Prior to hospitilization cognitive status: Alert/Oriented     Current cognitive status: Alert/Oriented     Walking or Climbing Stairs Difficulty none     Dressing/Bathing Difficulty none     Equipment Currently Used at Home none     Readmission within 30 days? No     Patient currently being followed by outpatient case management? No     Do you currently have service(s) that help you manage  your care at home? No     Do you have prescription coverage? Yes     Coverage Medicare     Do you have any problems affording any of your prescribed medications? TBD     Who is going to help you get home at discharge? n/a     How do you get to doctors appointments? car, drives self     Are you on dialysis? No     Do you take coumadin? No     Discharge Plan A Shelter     DME Needed Upon Discharge  glucometer   Diabetic supplies    Discharge Plan discussed with: Patient     Discharge Barriers Identified Homeless     SDOH Housing/Economic Concerns     Housing/Economic Concerns Yes     Housing/Economic Concerns Homeless

## 2022-02-10 NOTE — PLAN OF CARE
[facility] - Intensive Care  Discharge Final Note    Primary Care Provider: Wilton Rivera MD    Expected Discharge Date:      Final Discharge Note (most recent)       Final Note - 02/10/22 7745          Final Note    Assessment Type Final Discharge Note     Anticipated Discharge Disposition Left Against Medical Advice                     Important Message from Medicare

## 2022-02-10 NOTE — HOSPITAL COURSE
Mr Lawson presented with diabetic ketoacidosis secondary to insulin non compliance. Also with acute renal failure. Admitted to ICU and started on insulin infusion + intravenous fluids. DKA and renal failure resolved. Unfortunately patient left against medical advise before my formal evaluation the next day and did not wait for . I am concerned he will be readmitted with diabetic ketoacidosis soon if he cannot afford co-pay for insulin.

## 2022-02-10 NOTE — NURSING
"1902: At change of shift, patient requesting to leave AMA. Patient fully dressed refusing to remain connected to Insulin gtt and IVF. RN attempted to educate patient on the risk of leaving AMA and being in DKA. Patient stating " I have to go I will deal with my blood sugar later, just let me go" RN notified on call MD Gibbs and MD is coming to bedside.     1910: MD at bedside.     1915: Patient willing to stay until tomorrow to discuss current housing issue with . MD to change patients diet at this time as he is requesting to eat.   "

## 2022-02-10 NOTE — NURSING
Pt left ama 0955. Educated pt on risks of leaving. Explained pt still did not have his insulin and could very easily end up right back in dka. Asked pt to reconsider and speak to social work. Pt stated he had to get back to his car and would come back to er after retrieving his car. Pt verbalized he understood the risks of leaving ama and signed ama paper. Dr. Pinzon was notified of situation. Ivs were removed. Pt dressed and left of his own accord on foot.

## 2022-02-10 NOTE — DISCHARGE SUMMARY
[facility] [place] Intensive Care  [facility] Medicine  Discharge Summary      Patient Name: Damon Lawson III  MRN: [id number]  Patient Class: IP- Inpatient  Admission Date: 2/9/2022  Hospital Length of Stay: 1 days  Discharge Date and Time:  02/10/2022 4:42 PM  Attending Physician: No att. providers found   Discharging Provider: Martina Pinzon MD  Primary Care Provider: Wilton Rivera MD      HPI:   27 year old male with diabetes mellitus type 1 who  presented with generalized weakness of two days in evolution. Associated symptoms include excessive urination and thirst. Also with diarrhea. Patient recently relocated to [place] from [place] a few weeks ago. Apparently living in his car and ran out of insulin 3 days ago. States he cannot afford $5 deductible for a refill. Has not found a job yet. Takes 50 U of Lantus daily. In the ED, patient was tachycardic to 110s, low blood pressure and labwork consistent with diabetic ketoacidosis. Afebrile and stable O2 sats at room air. Given isotonic fluids and started on insulin infusion. Admitted to hospital medicine for ICU care.           * No surgery found *      Hospital Course:   Mr Lawson presented with diabetic ketoacidosis secondary to insulin non compliance. Also with acute renal failure. Admitted to ICU and started on insulin infusion + intravenous fluids. DKA and renal failure resolved. Unfortunately patient left against medical advise before my formal evaluation the next day and did not wait for . I am concerned he will be readmitted with diabetic ketoacidosis soon if he cannot afford co-pay for insulin.        Goals of Care Treatment Preferences:  Code Status: Full Code      Final Active Diagnoses:    Diagnosis Date Noted POA    PRINCIPAL PROBLEM:  Diabetic ketoacidosis without coma associated with type 1 diabetes mellitus [E10.10] 11/11/2018 Yes    Type 1 diabetes mellitus with hyperglycemia [E10.65] 02/09/2022 Yes    Acute renal failure [N17.9]  02/09/2022 Yes    Pseudohyponatremia [R79.89] 02/09/2022 Yes    High anion gap metabolic acidosis [E87.2] 02/09/2022 Yes    Hyperkalemia, diminished renal excretion [E87.5] 02/09/2022 Yes      Problems Resolved During this Admission:       Discharged Condition: against medical advice    Disposition: Left Against Medical Adv*      Time spent on the discharge of patient: > 35minutes      Martina Pinzon MD  Department of Hospital Medicine  [facility] [place] - Intensive Care

## 2022-02-13 LAB
BACTERIA BLD CULT: NORMAL
BACTERIA BLD CULT: NORMAL